# Patient Record
Sex: FEMALE | Race: BLACK OR AFRICAN AMERICAN | NOT HISPANIC OR LATINO | URBAN - METROPOLITAN AREA
[De-identification: names, ages, dates, MRNs, and addresses within clinical notes are randomized per-mention and may not be internally consistent; named-entity substitution may affect disease eponyms.]

---

## 2017-03-27 ENCOUNTER — OUTPATIENT (OUTPATIENT)
Dept: OUTPATIENT SERVICES | Facility: HOSPITAL | Age: 47
LOS: 1 days | Discharge: HOME | End: 2017-03-27
Payer: SELF-PAY

## 2017-03-27 VITALS
RESPIRATION RATE: 16 BRPM | HEART RATE: 92 BPM | HEIGHT: 67 IN | TEMPERATURE: 100 F | WEIGHT: 134.48 LBS | OXYGEN SATURATION: 100 % | DIASTOLIC BLOOD PRESSURE: 91 MMHG | SYSTOLIC BLOOD PRESSURE: 154 MMHG

## 2017-03-27 VITALS
TEMPERATURE: 97 F | DIASTOLIC BLOOD PRESSURE: 75 MMHG | HEART RATE: 83 BPM | RESPIRATION RATE: 16 BRPM | SYSTOLIC BLOOD PRESSURE: 126 MMHG | OXYGEN SATURATION: 100 %

## 2017-03-27 DIAGNOSIS — K81.9 CHOLECYSTITIS, UNSPECIFIED: ICD-10-CM

## 2017-03-27 LAB — HCG UR QL: NEGATIVE — SIGNIFICANT CHANGE UP

## 2017-03-27 PROCEDURE — 88304 TISSUE EXAM BY PATHOLOGIST: CPT | Mod: 26

## 2017-03-27 PROCEDURE — 47562 LAPAROSCOPIC CHOLECYSTECTOMY: CPT | Mod: AS

## 2017-03-27 RX ORDER — SODIUM CHLORIDE 9 MG/ML
1000 INJECTION, SOLUTION INTRAVENOUS
Qty: 0 | Refills: 0 | Status: DISCONTINUED | OUTPATIENT
Start: 2017-03-27 | End: 2017-03-27

## 2017-03-27 RX ORDER — DOCUSATE SODIUM 100 MG
1 CAPSULE ORAL
Qty: 20 | Refills: 0 | OUTPATIENT
Start: 2017-03-27

## 2017-03-27 RX ORDER — FENTANYL CITRATE 50 UG/ML
50 INJECTION INTRAVENOUS
Qty: 0 | Refills: 0 | Status: DISCONTINUED | OUTPATIENT
Start: 2017-03-27 | End: 2017-03-27

## 2017-03-27 RX ORDER — ACETAMINOPHEN 500 MG
1000 TABLET ORAL ONCE
Qty: 0 | Refills: 0 | Status: COMPLETED | OUTPATIENT
Start: 2017-03-27 | End: 2017-03-27

## 2017-03-27 RX ORDER — HYDROMORPHONE HYDROCHLORIDE 2 MG/ML
0.5 INJECTION INTRAMUSCULAR; INTRAVENOUS; SUBCUTANEOUS
Qty: 0 | Refills: 0 | Status: DISCONTINUED | OUTPATIENT
Start: 2017-03-27 | End: 2017-03-27

## 2017-03-27 RX ADMIN — Medication 1000 MILLIGRAM(S): at 14:11

## 2017-03-27 RX ADMIN — Medication 400 MILLIGRAM(S): at 14:11

## 2017-03-27 RX ADMIN — SODIUM CHLORIDE 75 MILLILITER(S): 9 INJECTION, SOLUTION INTRAVENOUS at 14:11

## 2017-03-27 NOTE — H&P PST ADULT - PROBLEM SELECTOR PLAN 1
-OR today with Dr. Conrad for laparoscopic cholecystectomy   -NPO, IVF, DVT ppx, pre-labs done, pain management  -d/c home today after PACU  -follow up appointment with Dr. Conrad  -will discuss with attending

## 2017-03-27 NOTE — ASU DISCHARGE PLAN (ADULT/PEDIATRIC). - INSTRUCTIONS
Drink plenty of fluids to prevent constipation and take Colace to prevent constipation. Rest as needed. Do not lift anything heavier than 10 pounds. You may take motrin or advil for mild pain as needed, in addition to prescribed narcotic medication. Call for any fever over 101, nausea, vomiting, severe pain, no passing of gas or bowel movement. Do not shower for 4 days after surgery. Remove dressing in 7 days. Leave the white steri strips in place; they will fall off in 5-7 days. Eat a low fat regular diet.

## 2017-03-27 NOTE — H&P PST ADULT - NEGATIVE GASTROINTESTINAL SYMPTOMS
no nausea/no jaundice/no hematochezia/no vomiting/no diarrhea/no abdominal pain/no melena/no constipation/no change in bowel habits

## 2017-03-27 NOTE — ASU DISCHARGE PLAN (ADULT/PEDIATRIC). - SPECIAL INSTRUCTIONS
Eat a low fat regular diet. Take Norco every 6 hours as needed for severe pain. Take Colace 3 times per day to prevent constipation.   Do not shower for 4 days after surgery. Remove dressing in 7 days. Leave the white steri strips in place; they will fall off by themselves.  Follow up with Dr. Conrad in 1 week. Please call the office to make an appointment.

## 2017-03-27 NOTE — ASU DISCHARGE PLAN (ADULT/PEDIATRIC). - NOTIFY
Numbness, tingling/Numbness, color, or temperature change to extremity/Excessive Diarrhea/Persistent Nausea and Vomiting/Swelling that continues/Increased Irritability or Sluggishness/Unable to Urinate/Inability to Tolerate Liquids or Foods/Bleeding that does not stop/Pain not relieved by Medications/Fever greater than 101

## 2017-03-27 NOTE — H&P PST ADULT - GASTROINTESTINAL DETAILS
no guarding/no masses palpable/no rebound tenderness/nontender/soft/no distention/no rigidity/bowel sounds normal

## 2017-03-27 NOTE — H&P PST ADULT - ASSESSMENT
45 y/o female no PMHx, surgical hx of 2 c-sections presents today c/o cholecystitis/biliary colic x 10 years. For laparoscopic cholecystectomy.

## 2017-03-27 NOTE — H&P PST ADULT - HISTORY OF PRESENT ILLNESS
47 y/o female no PMHx, surgical hx of 2 c-sections presents today c/o cholecystitis/biliary colic x 10 years. Patient states that she has had the biliary colic on and off for the past 10 years and she is unsure if pain is associated with food intake. Patient currently has no pain today and is asymptomatic. Patient denies severe abdominal pain, nausea, vomiting, fever, chills, constipation, diarrhea, chest pain, shortness of breath, dizziness.

## 2017-03-27 NOTE — ASU DISCHARGE PLAN (ADULT/PEDIATRIC). - MEDICATION SUMMARY - MEDICATIONS TO TAKE
I will START or STAY ON the medications listed below when I get home from the hospital:    Norco 5 mg-325 mg oral tablet  -- 1 tab(s) by mouth every 6 hours, As Needed -for severe pain MDD:4  -- Caution federal law prohibits the transfer of this drug to any person other  than the person for whom it was prescribed.  May cause drowsiness.  Alcohol may intensify this effect.  Use care when operating dangerous machinery.  This product contains acetaminophen.  Do not use  with any other product containing acetaminophen to prevent possible liver damage.  Using more of this medication than prescribed may cause serious breathing problems.    -- Indication: For Severe pain    LORazepam 0.5 mg oral tablet  -- 1 tab(s) by mouth 3 times a day  -- Indication: For Lorazepam    Colace 100 mg oral capsule  -- 1 cap(s) by mouth 3 times a day MDD:3  -- Medication should be taken with plenty of water.    -- Indication: For Laxative

## 2017-03-28 LAB — SURGICAL PATHOLOGY FINAL REPORT - CH: SIGNIFICANT CHANGE UP

## 2017-03-31 DIAGNOSIS — K80.10 CALCULUS OF GALLBLADDER WITH CHRONIC CHOLECYSTITIS WITHOUT OBSTRUCTION: ICD-10-CM
